# Patient Record
Sex: FEMALE | Race: BLACK OR AFRICAN AMERICAN | NOT HISPANIC OR LATINO | URBAN - NONMETROPOLITAN AREA
[De-identification: names, ages, dates, MRNs, and addresses within clinical notes are randomized per-mention and may not be internally consistent; named-entity substitution may affect disease eponyms.]

---

## 2019-05-23 ENCOUNTER — IMPORTED ENCOUNTER (OUTPATIENT)
Dept: URBAN - NONMETROPOLITAN AREA CLINIC 1 | Facility: CLINIC | Age: 34
End: 2019-05-23

## 2019-05-23 PROBLEM — H35.013: Noted: 2019-05-23

## 2019-05-23 PROBLEM — E11.9: Noted: 2019-05-23

## 2019-05-23 PROBLEM — H44.133: Noted: 2019-05-23

## 2019-05-23 PROCEDURE — 99203 OFFICE O/P NEW LOW 30 MIN: CPT

## 2019-05-23 PROCEDURE — 92134 CPTRZ OPH DX IMG PST SGM RTA: CPT

## 2019-05-23 NOTE — PATIENT DISCUSSION
"Uveitis OU-Discussed findings of exam in detail with the patient.-Discussed the possible chronic nature of this disease and recurrent flare-ups. -Prescribed homatropine drops qhr OU -Order OCT MAC today-Refer to rheumatologist.DM s DR-BSL and A1C are unknown but ""good""-Stressed the importance of keeping blood sugars under control blood pressure under control and weight normalization and regular visits with PCP. -Explained the possible effects of poorly controlled diabetes and the damage that diabetes can cause to ocular health. -Patient to check HgbA1C.-Pt instructed to contact our office with any vision changes. Myopia/Astigmatism OU-Discussed diagnosis with patient. -Explained that people who are myopic are at a higher risk for developing RD/RT and reviewed associated S&S.-Pt to contact our office if symptoms develop. Updated spec Rx given. Recommend lens that will provide comfort as well as protect safety and health of eyes. ASVD +3-Continue to monitorRTC 1 Wk Uveitis"

## 2019-06-04 ENCOUNTER — IMPORTED ENCOUNTER (OUTPATIENT)
Dept: URBAN - NONMETROPOLITAN AREA CLINIC 1 | Facility: CLINIC | Age: 34
End: 2019-06-04

## 2019-06-04 PROCEDURE — 99212 OFFICE O/P EST SF 10 MIN: CPT

## 2019-06-04 NOTE — PATIENT DISCUSSION
"Uveitis OU-Discussed findings of exam in detail with the patient.-Discussed the possible chronic nature of this disease and recurrent flare-ups. -Prescribed homatropine drops qhr OU -IOP elevated 23:20 06/04/19 START timolol bid OU sample given-Order OCT MAC-Refer to rheumatologist waiting for office to contact pt. DM s DR-BSL and A1C are unknown but ""good""-Stressed the importance of keeping blood sugars under control blood pressure under control and weight normalization and regular visits with PCP. -Explained the possible effects of poorly controlled diabetes and the damage that diabetes can cause to ocular health. -Patient to check HgbA1C.-Pt instructed to contact our office with any vision changes. Myopia/Astigmatism OU-Discussed diagnosis with patient. -Explained that people who are myopic are at a higher risk for developing RD/RT and reviewed associated S&S.-Pt to contact our office if symptoms develop. ASVD +3-Continue to monitorRTC 2 Wk Uveitis"

## 2019-06-18 ENCOUNTER — IMPORTED ENCOUNTER (OUTPATIENT)
Dept: URBAN - NONMETROPOLITAN AREA CLINIC 1 | Facility: CLINIC | Age: 34
End: 2019-06-18

## 2019-06-18 PROCEDURE — 99212 OFFICE O/P EST SF 10 MIN: CPT

## 2019-06-18 NOTE — PATIENT DISCUSSION
"Uveitis OU-Discussed findings of exam in detail with the patient.-Discussed the possible chronic nature of this disease and recurrent flare-ups.-Continue homatropine drops qhs OU -START prednisolone bid [id number] OU 06/18/19-Refer to rheumatologist waiting for office to contact pt. DM s DR-BSL and A1C are unknown but ""good""-Stressed the importance of keeping blood sugars under control blood pressure under control and weight normalization and regular visits with PCP. -Explained the possible effects of poorly controlled diabetes and the damage that diabetes can cause to ocular health. -Patient to check HgbA1C.-Pt instructed to contact our office with any vision changes. Myopia/Astigmatism OU-Discussed diagnosis with patient. -Explained that people who are myopic are at a higher risk for developing RD/RT and reviewed associated S&S.-Pt to contact our office if symptoms develop. ASVD +3-Continue to monitorRTC 2 -3 Wk Uveitis"

## 2022-04-09 ASSESSMENT — TONOMETRY
OD_IOP_MMHG: 18
OS_IOP_MMHG: 20
OS_IOP_MMHG: 20
OD_IOP_MMHG: 23
OD_IOP_MMHG: 26
OS_IOP_MMHG: 18

## 2022-04-09 ASSESSMENT — VISUAL ACUITY
OS_SC: J3+
OS_SC: 20/30
OS_PH: 20/30
OD_CC: 20/70+1
OD_PH: 20/30
OD_SC: 20/30
OS_CC: 20/60
OS_CC: 20/60
OD_SC: J7

## 2022-04-09 ASSESSMENT — KERATOMETRY
OS_K2POWER_DIOPTERS: 44.5
OS_AXISANGLE_DEGREES: 085
OD_AXISANGLE_DEGREES: 090
OD_K2POWER_DIOPTERS: 45.5
OD_K1POWER_DIOPTERS: 46.5
OS_K1POWER_DIOPTERS: 45

## 2024-02-09 ENCOUNTER — ESTABLISHED PATIENT (OUTPATIENT)
Dept: URBAN - NONMETROPOLITAN AREA CLINIC 1 | Facility: CLINIC | Age: 39
End: 2024-02-09

## 2024-02-09 DIAGNOSIS — H52.11: ICD-10-CM

## 2024-02-09 PROCEDURE — S0620AEC ROUTINE OPH EXAM INCLUDES REF/ NEW PATIENT

## 2024-02-09 ASSESSMENT — TONOMETRY
OD_IOP_MMHG: 18
OS_IOP_MMHG: 18

## 2024-02-09 ASSESSMENT — VISUAL ACUITY
OD_SC: 20/100
OD_PH: 20/80

## 2025-06-20 ENCOUNTER — COMPREHENSIVE EXAM (OUTPATIENT)
Age: 40
End: 2025-06-20

## 2025-06-20 DIAGNOSIS — H25.813: ICD-10-CM

## 2025-06-20 DIAGNOSIS — H21.543: ICD-10-CM

## 2025-06-20 DIAGNOSIS — H33.311: ICD-10-CM

## 2025-06-20 DIAGNOSIS — E11.9: ICD-10-CM

## 2025-06-20 PROCEDURE — 99214 OFFICE O/P EST MOD 30 MIN: CPT
